# Patient Record
Sex: FEMALE
[De-identification: names, ages, dates, MRNs, and addresses within clinical notes are randomized per-mention and may not be internally consistent; named-entity substitution may affect disease eponyms.]

---

## 2018-08-13 ENCOUNTER — HOSPITAL ENCOUNTER (EMERGENCY)
Dept: HOSPITAL 14 - H.ER | Age: 33
Discharge: HOME | End: 2018-08-13
Payer: SELF-PAY

## 2018-08-13 VITALS
RESPIRATION RATE: 16 BRPM | DIASTOLIC BLOOD PRESSURE: 84 MMHG | OXYGEN SATURATION: 98 % | HEART RATE: 74 BPM | TEMPERATURE: 98.4 F | SYSTOLIC BLOOD PRESSURE: 138 MMHG

## 2018-08-13 VITALS — BODY MASS INDEX: 22.4 KG/M2

## 2018-08-13 DIAGNOSIS — R07.89: Primary | ICD-10-CM

## 2018-08-13 LAB
ALBUMIN SERPL-MCNC: 4.4 G/DL (ref 3.5–5)
ALBUMIN/GLOB SERPL: 1.3 {RATIO} (ref 1–2.1)
ALT SERPL-CCNC: 22 U/L (ref 9–52)
AST SERPL-CCNC: 24 U/L (ref 14–36)
BASOPHILS # BLD AUTO: 0 K/UL (ref 0–0.2)
BASOPHILS NFR BLD: 0.5 % (ref 0–2)
BUN SERPL-MCNC: 17 MG/DL (ref 7–17)
CALCIUM SERPL-MCNC: 8.9 MG/DL (ref 8.4–10.2)
EOSINOPHIL # BLD AUTO: 0.3 K/UL (ref 0–0.7)
EOSINOPHIL NFR BLD: 4.1 % (ref 0–4)
ERYTHROCYTE [DISTWIDTH] IN BLOOD BY AUTOMATED COUNT: 12.9 % (ref 11.5–14.5)
GFR NON-AFRICAN AMERICAN: > 60
HGB BLD-MCNC: 14 G/DL (ref 12–16)
LYMPHOCYTES # BLD AUTO: 2.2 K/UL (ref 1–4.3)
LYMPHOCYTES NFR BLD AUTO: 35.3 % (ref 20–40)
MCH RBC QN AUTO: 31.7 PG (ref 27–31)
MCHC RBC AUTO-ENTMCNC: 34.7 G/DL (ref 33–37)
MCV RBC AUTO: 91.3 FL (ref 81–99)
MONOCYTES # BLD: 0.4 K/UL (ref 0–0.8)
MONOCYTES NFR BLD: 6.8 % (ref 0–10)
NEUTROPHILS # BLD: 3.3 K/UL (ref 1.8–7)
NEUTROPHILS NFR BLD AUTO: 53.3 % (ref 50–75)
NRBC BLD AUTO-RTO: 0 % (ref 0–0)
PLATELET # BLD: 257 K/UL (ref 130–400)
PMV BLD AUTO: 8 FL (ref 7.2–11.7)
RBC # BLD AUTO: 4.41 MIL/UL (ref 3.8–5.2)
WBC # BLD AUTO: 6.2 K/UL (ref 4.8–10.8)

## 2018-08-13 NOTE — ED PDOC
HPI: Chest Pain


Time Seen by Provider: 18 21:00


Chief Complaint (Nursing): Chest Pain


Chief Complaint (Provider): chest pain


History Per:  (#[id number])


History/Exam Limitations: no limitations


Onset/Duration Of Symptoms: Days (1), Intermittent Episodes


Current Symptoms Are (Timing): Gone Now


Severity: Mild


Associated Symptoms: denies: Nausea, Dyspnea, Diaphoresis


Modifying Factors: None


Exacerbating Factors: None


Additional Complaint(s): 





32yo female c/o right sided chest pain "like something is stuck" in my chest. 

Started after "i got angry". Denies trouble swallowing, SOB, leg pain/edema, 

syncope, dizziness, fever or cough.


Had similar symptoms earlier this year went to the clinic for symptoms. Unsure 

of prior diagnosis.  





Past Medical History


Reviewed: Historical Data, Nursing Documentation, Vital Signs


Vital Signs: 


 Last Vital Signs











Temp  98.4 F   18 19:31


 


Pulse  74   18 19:31


 


Resp  16   18 19:31


 


BP  138/84   18 19:31


 


Pulse Ox  98   18 21:42














- Medical History


PMH: GERD





- Surgical History


Surgical History: 





- Family History


Family History: States: Unknown Family Hx


   Denies: Stroke, MI, CAD, Diabetes, Hypertension





- Living Arrangements


Living Arrangements: With Family





- Social History


Current smoker - smoking cessation education provided: No





- Immunization History


Hx Tetanus Toxoid Vaccination: Yes


Hx Influenza Vaccination: Yes


Hx Pneumococcal Vaccination: Yes





- Home Medications


Home Medications: 


 Ambulatory Orders











 Medication  Instructions  Recorded


 


Dicyclomine [Bentyl] 20 mg PO Q6 PRN #12 tab 10/12/16


 


Famotidine [Pepcid] 40 mg PO DAILY #10 tab 10/12/16


 


Omeprazole 40 mg PO DAILY #30 tab 10/12/16


 


Amoxicillin/Clavulanate [Augmentin 1 tab PO BID #20 tab 11/10/16





875 MG-125 MG]  














- Allergies


Allergies/Adverse Reactions: 


 Allergies











Allergy/AdvReac Type Severity Reaction Status Date / Time


 


No Known Allergies Allergy   Verified 10/12/16 14:11














Wells Criteria for PE





- Wells Criteria for Pulmonary Embolism


Clinical Signs and Symptoms of DVT: No


P.E is #1 Diagnosis, or Equally Likely: No


Heart Rate >100: No


Immobilization at least 3 days;Surgery previous 4 weeks: No


Previous, objectively diagnosed PE or DVT: No


Hemoptysis: No


Malignancy w/treatment within 6 months, or palliative: No


Total Score: 0





Review of Systems


Constitutional: Negative for: Fever


Cardiovascular: Positive for: Chest Pain.  Negative for: Palpitations, Orthopnea


Respiratory: Negative for: Cough, Shortness of Breath


Gastrointestinal: Negative for: Nausea, Abdominal Pain


Musculoskeletal: Negative for: Neck Pain, Shoulder Pain, Back Pain, Leg Pain


Skin: Negative for: Rash, Lesions


Neurological: Positive for: Dizziness.  Negative for: Weakness, Numbness, 

Headache


Psych: Negative for: Depression





Physical Exam





- Reviewed


Nursing Documentation Reviewed: Yes


Vital Signs Reviewed: Yes





- Physical Exam


Appears: Positive for: Well, Non-toxic, No Acute Distress


Head Exam: Positive for: ATRAUMATIC, NORMAL INSPECTION, NORMOCEPHALIC


Skin: Positive for: Normal Color, Warm, DRY


Eye Exam: Positive for: EOMI, Normal appearance, PERRL


ENT: Positive for: Normal ENT Inspection


Neck: Positive for: Normal, Painless ROM


Cardiovascular/Chest: Positive for: Regular Rate, Rhythm, Chest Non Tender


Respiratory: Positive for: CNT, Normal Breath Sounds


Pulses-Radial (L): 3+/4+


Pulses-Radial (R): 3+/4+


Gastrointestinal/Abdominal: Positive for: Soft.  Negative for: Tenderness, 

Guarding


Back: Positive for: Normal Inspection


Extremity: Positive for: Normal ROM


Neurologic/Psych: Positive for: Alert, Oriented.  Negative for: Motor/Sensory 

Deficits





- Laboratory Results


Result Diagrams: 


 18 21:32





 18 21:32





- ECG


O2 Sat by Pulse Oximetry: 98





Medical Decision Making


Medical Decision Making: 





HEART score 0





labs and CXR reviewed by me, negative





DC home and followup PMD


Denies symptoms associated with swallowing or food ingestion











Disposition





- Clinical Impression


Clinical Impression: 


 Chest pain








- Patient ED Disposition


Is Patient to be Admitted: No


Counseled Patient/Family Regarding: Studies Performed, Diagnosis, Need For 

Followup





- Disposition


Referrals: 


[facility] [Outside]


Disposition: Routine/Home


Disposition Time: 22:05


Condition: STABLE


Additional Instructions: 


Followup with clinic for further testing. Return to ER for any worse or new 

symptoms.


Instructions:  Chest Pain


Forms:  Crush on original products (English)


Print Language: Croatian

## 2018-08-14 NOTE — CARD
--------------- APPROVED REPORT --------------





Date of service: 08/13/2018



<Conclusion>

Normal sinus rhythm

Normal ECG

## 2018-08-14 NOTE — RAD
HISTORY:



COMPARISON:

10/12/2016.



TECHNIQUE:

Chest PA and lateral



FINDINGS:



LINES AND TUBES:

None. 



LUNG AND PLEURA:

The lungs are well inflated and clear. No pleural effusion or 

pneumothorax.



HEART AND MEDIASTINUM:

The heart is not enlarged. The hilar and mediastinal contours are 

within normal limits.



SKELETAL STRUCTURES:

The bony structures are within normal limits for the patient's age.



VISUALIZED UPPER ABDOMEN:

Normal.



OTHER FINDINGS:

None.



IMPRESSION:

No active pulmonary disease.